# Patient Record
(demographics unavailable — no encounter records)

---

## 2025-05-11 NOTE — HISTORY OF PRESENT ILLNESS
[FreeTextEntry1] : Patient presents for comprehensive medical evaluation today.   [de-identified] : Patient presents for comprehensive medical evaluation today.  Presents to establish care  62F - PMHx: Prediabetes, HLD  FBW reviewed with patient Has been drinking alcohol more often - acknowledges she must cut down  LFTs elevated

## 2025-05-11 NOTE — HISTORY OF PRESENT ILLNESS
[FreeTextEntry1] : Patient presents for comprehensive medical evaluation today.   [de-identified] : Patient presents for comprehensive medical evaluation today.  Presents to establish care  62F - PMHx: Prediabetes, HLD  FBW reviewed with patient Has been drinking alcohol more often - acknowledges she must cut down  LFTs elevated

## 2025-05-11 NOTE — HEALTH RISK ASSESSMENT
[Good] : ~his/her~  mood as  good [1 or 2 (0 pts)] : 1 or 2 (0 points) [Never (0 pts)] : Never (0 points) [No falls in past year] : Patient reported no falls in the past year [0] : 2) Feeling down, depressed, or hopeless: Not at all (0) [PHQ-2 Negative - No further assessment needed] : PHQ-2 Negative - No further assessment needed [Never] : Never [NO] : No [Patient reported mammogram was normal] : Patient reported mammogram was normal [Patient reported PAP Smear was normal] : Patient reported PAP Smear was normal [Patient reported colonoscopy was normal] : Patient reported colonoscopy was normal [None] : None [With Family] : lives with family [Employed] : employed [] :  [Feels Safe at Home] : Feels safe at home [Fully functional (bathing, dressing, toileting, transferring, walking, feeding)] : Fully functional (bathing, dressing, toileting, transferring, walking, feeding) [Fully functional (using the telephone, shopping, preparing meals, housekeeping, doing laundry, using] : Fully functional and needs no help or supervision to perform IADLs (using the telephone, shopping, preparing meals, housekeeping, doing laundry, using transportation, managing medications and managing finances) [Reports normal functional visual acuity (ie: able to read med bottle)] : Reports normal functional visual acuity [Smoke Detector] : smoke detector [Carbon Monoxide Detector] : carbon monoxide detector [Safety elements used in home] : safety elements used in home [Seat Belt] :  uses seat belt [Sunscreen] : uses sunscreen [Reviewed no changes] : Reviewed, no changes [No] : In the past 12 months have you used drugs other than those required for medical reasons? No [Time Spent: ___ Minutes] : I spent [unfilled] minutes performing a depression screening for this patient. [With Patient/Caregiver] : , with patient/caregiver [Audit-CScore] : 0 [de-identified] : active [de-identified] : balanced varied diet without restrictions  [[id number]] : 0 [Change in mental status noted] : No change in mental status noted [Language] : denies difficulty with language [Behavior] : denies difficulty with behavior [Learning/Retaining New Information] : denies difficulty learning/retaining new information [Handling Complex Tasks] : denies difficulty handling complex tasks [Reasoning] : denies difficulty with reasoning [Spatial Ability and Orientation] : denies difficulty with spatial ability and orientation [High Risk Behavior] : no high risk behavior [Reports changes in hearing] : Reports no changes in hearing [Reports changes in vision] : Reports no changes in vision [Reports changes in dental health] : Reports no changes in dental health [Travel to Developing Areas] : does not  travel to developing areas [TB Exposure] : is not being exposed to tuberculosis [Caregiver Concerns] : does not have caregiver concerns [MammogramDate] : 9/2024 [PapSmearDate] : 4/2024 [ColonoscopyDate] : 4/2025 [ColonoscopyComments] : Dr. Ron Puga - polyp - 5 years  [AdvancecareDate] : 5/2025

## 2025-05-11 NOTE — HEALTH RISK ASSESSMENT
[Good] : ~his/her~  mood as  good [1 or 2 (0 pts)] : 1 or 2 (0 points) [Never (0 pts)] : Never (0 points) [No falls in past year] : Patient reported no falls in the past year [0] : 2) Feeling down, depressed, or hopeless: Not at all (0) [PHQ-2 Negative - No further assessment needed] : PHQ-2 Negative - No further assessment needed [Never] : Never [NO] : No [Patient reported mammogram was normal] : Patient reported mammogram was normal [Patient reported PAP Smear was normal] : Patient reported PAP Smear was normal [Patient reported colonoscopy was normal] : Patient reported colonoscopy was normal [None] : None [With Family] : lives with family [Employed] : employed [] :  [Feels Safe at Home] : Feels safe at home [Fully functional (bathing, dressing, toileting, transferring, walking, feeding)] : Fully functional (bathing, dressing, toileting, transferring, walking, feeding) [Fully functional (using the telephone, shopping, preparing meals, housekeeping, doing laundry, using] : Fully functional and needs no help or supervision to perform IADLs (using the telephone, shopping, preparing meals, housekeeping, doing laundry, using transportation, managing medications and managing finances) [Reports normal functional visual acuity (ie: able to read med bottle)] : Reports normal functional visual acuity [Smoke Detector] : smoke detector [Carbon Monoxide Detector] : carbon monoxide detector [Safety elements used in home] : safety elements used in home [Seat Belt] :  uses seat belt [Sunscreen] : uses sunscreen [Reviewed no changes] : Reviewed, no changes [No] : In the past 12 months have you used drugs other than those required for medical reasons? No [Time Spent: ___ Minutes] : I spent [unfilled] minutes performing a depression screening for this patient. [With Patient/Caregiver] : , with patient/caregiver [Audit-CScore] : 0 [de-identified] : active [de-identified] : balanced varied diet without restrictions  [[id number]] : 0 [Change in mental status noted] : No change in mental status noted [Language] : denies difficulty with language [Behavior] : denies difficulty with behavior [Learning/Retaining New Information] : denies difficulty learning/retaining new information [Handling Complex Tasks] : denies difficulty handling complex tasks [Reasoning] : denies difficulty with reasoning [Spatial Ability and Orientation] : denies difficulty with spatial ability and orientation [High Risk Behavior] : no high risk behavior [Reports changes in hearing] : Reports no changes in hearing [Reports changes in vision] : Reports no changes in vision [Reports changes in dental health] : Reports no changes in dental health [Travel to Developing Areas] : does not  travel to developing areas [TB Exposure] : is not being exposed to tuberculosis [Caregiver Concerns] : does not have caregiver concerns [MammogramDate] : 9/2024 [PapSmearDate] : 4/2024 [ColonoscopyDate] : 4/2025 [ColonoscopyComments] : Dr. Ron Puga - polyp - 5 years  [AdvancecareDate] : 5/2025

## 2025-05-11 NOTE — ASSESSMENT
[Vaccines Reviewed] : Immunizations reviewed today. Please see immunization details in the vaccine log within the immunization flowsheet.  [FreeTextEntry1] : EKG obtained to assist in diagnosis and management of assessed problem(s).    EKG shows sinus rhythm without acute ischemic changes.  #Transaminitis ?fatty liver  advise alcohol cessation  [ ] RUQ US   #Prediabetes diet and lifestyle modification discussed at length  #HTN Patient with history of hypertension.  BP today elevated - will check at home Patient to continue current regimen as prescribed. Patient instructed to continue monitoring blood pressure at home and to keep a journal. Will continue to monitor patient's bp and adjust his hypertensive regimen as needed.   I spent a total of 40 minutes on the date of this encounter that EXCLUDES time spent on AWV, preventive exam, depression screening, tobacco cessation, lung cancer screening and behavioral counseling.  This additional time included: Preparing to see the patient (e.g., review of test results) Obtaining and/or reviewing separately obtained history Performing a medically appropriate exam and/or evaluation Counseling and educating the patient/family/caregiver Ordering medications, tests, procedures Referring and communicating with other healthcare professionals, when not separately reported Documenting clinical information in the health record Care coordination not separately reported

## 2025-05-29 NOTE — HISTORY OF PRESENT ILLNESS
[facility]  Progress Note - Angeli Hernández 1948, 67 y o  female MRN: [id number]  Unit/Bed#: W -01 Encounter: [id number]  Primary Care Provider: Vanessa Ellison DO   Date and time admitted to hospital: 1/19/2021 12:21 PM    * Pneumoperitoneum  Assessment & Plan  · KUB performed this morning was noted to show free air under the diaphragm  This is new from CT scan on admission  · Concerned that this could be related to EGD/colonoscopy performed yesterday  · General surgery and GI discussed  · General surgery taking the patient to the OR for exploratory laparotomy this afternoon  · Patient will be made NPO  · Continue IV fluid hydration and pain control  · Family was updated by both GI and General surgery  C  difficile diarrhea  Assessment & Plan  · Noted to have PCR positive but EIA  · Given no prior history and evidence of diarrhea, started on vancomycin  · Plan for treatment x 14 days  · Continue without patient vancomycin for a total of 14 days  · Continue through to 2/3/21     Acute GI bleeding  Assessment & Plan   CT scan on admission with no acute findings to account for GI bleeding  No colitis or abscess noted  She has stable postsurgical findings   S/P EGD and colonoscopy 1/20 - likely self limited diverticular bleed  Old blood noted and poor prep   EGD with moderate gastritis and candida esophagitis  o Continue PPI   GI and general surgery following - now with pneumoperitoneum and proceeding with exploratory laparotomy   Hemoglobin stable and has not required any transfusions    Lab Results   Component Value Date    HGB 10 2 (L) 01/20/2021    HGB 10 7 (L) 01/20/2021    HGB 12 4 01/19/2021    HGB 14 3 01/18/2021       H/O hernia repair  Assessment & Plan  · History of hernia repair with Dr Snow Dang on 1/4/2021  · She was seen in her office day prior to admission and subsequently sent into the emergency department with more episodes of diarrhea    · Feel that her acute GI bleeding is not related to surgery, and merely coincidental   · CT revealing stable postsurgical findings  · Outpatient follow-up as directed with surgery team    Hyperlipidemia  Assessment & Plan  · Continue statin  VTE Pharmacologic Prophylaxis: VTE Score: 3 Moderate Risk (Score 3-4) - Pharmacological DVT Prophylaxis Contraindicated  Sequential Compression Devices Ordered  Patient Centered Rounds: I have performed bedside rounds with nursing staff today  Helen Landau with Specialists or Other Care Team Provider: GI, surgery    Education and Discussions with Family / Patient: Updated  () via phone  Time Spent for Care: 30 minutes  More than 50% of total time spent on counseling and coordination of care as described above  Current Length of Stay: 2 day(s)  Current Patient Status: Inpatient   Certification Statement: The patient will continue to require additional inpatient hospital stay due to exploratory lapartomy  Discharge Plan: Anticipate discharge in 48 hrs to home  Code Status: Level 1 - Full Code    Subjective:   Pain in shoulders this AM  Tolerated clear liquids  Now aware of free air and plan for surgery    Objective:     Vitals:   Temp (24hrs), Av 1 °F (36 7 °C), Min:97 6 °F (36 4 °C), Max:98 5 °F (36 9 °C)    Temp:  [97 6 °F (36 4 °C)-98 5 °F (36 9 °C)] 98 5 °F (36 9 °C)  HR:  [] 105  Resp:  [16-20] 16  BP: ()/(50-99) 161/99  SpO2:  [90 %-98 %] 94 %  Body mass index is 26 33 kg/m²  Input and Output Summary (last 24 hours): Intake/Output Summary (Last 24 hours) at 2021 1207  Last data filed at 2021 1133  Gross per 24 hour   Intake 2346 ml   Output 400 ml   Net 1946 ml       Physical Exam:   Physical Exam  Vitals signs and nursing note reviewed  Constitutional:       General: She is not in acute distress  Appearance: Normal appearance  She is not diaphoretic  HENT:      Head: Normocephalic and atraumatic  Mouth/Throat:      Mouth: Mucous membranes are moist    Cardiovascular:      Rate and Rhythm: Normal rate and regular rhythm  Pulses: Normal pulses  Pulmonary:      Effort: Pulmonary effort is normal       Breath sounds: Normal breath sounds  No wheezing or rales  Abdominal:      Palpations: There is no mass  Tenderness: There is abdominal tenderness (periumbilical)  There is no guarding  Musculoskeletal:      Right lower leg: No edema  Left lower leg: No edema  Skin:     General: Skin is warm and dry  Neurological:      Mental Status: She is alert  Psychiatric:      Comments: tearful        Additional Data:     Labs:  Results from last 7 days   Lab Units 01/21/21  0436  01/20/21  0447   WBC Thousand/uL 10 48*  --  8 85   HEMOGLOBIN g/dL 10 6*   < > 10 7*   HEMATOCRIT % 33 2*   < > 34 1*   PLATELETS Thousands/uL 329  --  320   NEUTROS PCT %  --   --  68   LYMPHS PCT %  --   --  22   MONOS PCT %  --   --  6   EOS PCT %  --   --  2    < > = values in this interval not displayed  Results from last 7 days   Lab Units 01/21/21  0436  01/19/21  1253   SODIUM mmol/L 144   < > 140   POTASSIUM mmol/L 3 5   < > 4 0   CHLORIDE mmol/L 108   < > 104   CO2 mmol/L 27   < > 27   BUN mg/dL 7   < > 19   CREATININE mg/dL 0 89   < > 0 90   ANION GAP mmol/L 9   < > 9   CALCIUM mg/dL 8 4   < > 8 9   ALBUMIN g/dL  --   --  3 5   TOTAL BILIRUBIN mg/dL  --   --  0 33   ALK PHOS U/L  --   --  83   ALT U/L  --   --  65   AST U/L  --   --  31   GLUCOSE RANDOM mg/dL 123   < > 76    < > = values in this interval not displayed       Results from last 7 days   Lab Units 01/19/21  1253   INR  0 96             Results from last 7 days   Lab Units 01/19/21  1253   LACTIC ACID mmol/L 1 7       Lines/Drains:  Invasive Devices     Peripheral Intravenous Line            Peripheral IV 01/19/21 Left Antecubital 1 day                Telemetry:        Imaging: Reviewed radiology reports from this admission including: abdominal/pelvic CT scan and xray(s)  Personally reviewed the following imaging: xray(s)    Recent Cultures (last 7 days):   Results from last 7 days   Lab Units 01/20/21  0001   C DIFF TOXIN B BY PCR  Positive*       Last 24 Hours Medication List:   Current Facility-Administered Medications   Medication Dose Route Frequency Provider Last Rate    acetaminophen  650 mg Oral Q6H PRN Michael Flores PA-C      HYDROmorphone  1 mg Intravenous Q1H PRN Donald Hart MD      metroNIDAZOLE  500 mg Intravenous Q8H Donald Hart MD      ondansetron  4 mg Intravenous Q6H PRN Michael Flores PA-C      pantoprazole  40 mg Oral BID AC Juanito Gomez DO      sodium chloride  125 mL/hr Intravenous Continuous Emmy Hooks PA-C 125 mL/hr (01/21/21 1133)    vancomycin  125 mg Oral Q6H [address] Albina Gutierrez PA-C          Today, Patient Was Seen By: Miguel Lopez PA-C    ** Please Note: Dictation voice to text software may have been used in the creation of this document   ** [FreeTextEntry1] : Patient presents today for follow-up of chronic medical conditions.  [de-identified] : 62F - PMHx: Prediabetes, HLD  BP at home and in recent office visits elevated - never previously on medication FBW reviewed with patient Has been drinking alcohol more often - acknowledges she must cut down  LFTs elevated

## 2025-05-29 NOTE — ASSESSMENT
[Vaccines Reviewed] : Immunizations reviewed today. Please see immunization details in the vaccine log within the immunization flowsheet.  [FreeTextEntry1] : EKG obtained to assist in diagnosis and management of assessed problem(s).    EKG shows sinus rhythm without acute ischemic changes.  #Transaminitis ?fatty liver  advise alcohol cessation  [ ] RUQ US   #Prediabetes diet and lifestyle modification discussed at length  #HTN Patient with history of hypertension.  [ ] start amlodipine 2.5 - will continue to monitor and will increase to 5 mg if remains elevated Patient to continue current regimen as prescribed. Patient instructed to continue monitoring blood pressure at home and to keep a journal. Will continue to monitor patient's bp and adjust his hypertensive regimen as needed.   I spent a total of 40 minutes on the date of this encounter that EXCLUDES time spent on AWV, preventive exam, depression screening, tobacco cessation, lung cancer screening and behavioral counseling.  This additional time included: Preparing to see the patient (e.g., review of test results) Obtaining and/or reviewing separately obtained history Performing a medically appropriate exam and/or evaluation Counseling and educating the patient/family/caregiver Ordering medications, tests, procedures Referring and communicating with other healthcare professionals, when not separately reported Documenting clinical information in the health record Care coordination not separately reported

## 2025-05-29 NOTE — HEALTH RISK ASSESSMENT
[Good] : ~his/her~  mood as  good [1 or 2 (0 pts)] : 1 or 2 (0 points) [Never (0 pts)] : Never (0 points) [No] : In the past 12 months have you used drugs other than those required for medical reasons? No [No falls in past year] : Patient reported no falls in the past year [0] : 2) Feeling down, depressed, or hopeless: Not at all (0) [PHQ-2 Negative - No further assessment needed] : PHQ-2 Negative - No further assessment needed [Time Spent: ___ Minutes] : I spent [unfilled] minutes performing a depression screening for this patient. [With Patient/Caregiver] : , with patient/caregiver [Reviewed no changes] : Reviewed, no changes [Never] : Never [NO] : No [Patient reported mammogram was normal] : Patient reported mammogram was normal [Patient reported PAP Smear was normal] : Patient reported PAP Smear was normal [Patient reported colonoscopy was normal] : Patient reported colonoscopy was normal [None] : None [With Family] : lives with family [Employed] : employed [] :  [Feels Safe at Home] : Feels safe at home [Fully functional (bathing, dressing, toileting, transferring, walking, feeding)] : Fully functional (bathing, dressing, toileting, transferring, walking, feeding) [Fully functional (using the telephone, shopping, preparing meals, housekeeping, doing laundry, using] : Fully functional and needs no help or supervision to perform IADLs (using the telephone, shopping, preparing meals, housekeeping, doing laundry, using transportation, managing medications and managing finances) [Reports normal functional visual acuity (ie: able to read med bottle)] : Reports normal functional visual acuity [Smoke Detector] : smoke detector [Carbon Monoxide Detector] : carbon monoxide detector [Safety elements used in home] : safety elements used in home [Seat Belt] :  uses seat belt [Sunscreen] : uses sunscreen [Audit-CScore] : 0 [de-identified] : active [de-identified] : balanced varied diet without restrictions  [[id number]] : 0 [AdvancecareDate] : 5/2025 [Change in mental status noted] : No change in mental status noted [Language] : denies difficulty with language [Behavior] : denies difficulty with behavior [Learning/Retaining New Information] : denies difficulty learning/retaining new information [Handling Complex Tasks] : denies difficulty handling complex tasks [Reasoning] : denies difficulty with reasoning [Spatial Ability and Orientation] : denies difficulty with spatial ability and orientation [High Risk Behavior] : no high risk behavior [Reports changes in hearing] : Reports no changes in hearing [Reports changes in vision] : Reports no changes in vision [Reports changes in dental health] : Reports no changes in dental health [Travel to Developing Areas] : does not  travel to developing areas [TB Exposure] : is not being exposed to tuberculosis [Caregiver Concerns] : does not have caregiver concerns [MammogramDate] : 9/2024 [PapSmearDate] : 4/2024 [ColonoscopyDate] : 4/2025 [ColonoscopyComments] : Dr. Ron Puga - polyp - 5 years

## 2025-07-30 NOTE — HEALTH RISK ASSESSMENT
[Good] : ~his/her~  mood as  good [1 or 2 (0 pts)] : 1 or 2 (0 points) [Never (0 pts)] : Never (0 points) [No] : In the past 12 months have you used drugs other than those required for medical reasons? No [No falls in past year] : Patient reported no falls in the past year [0] : 2) Feeling down, depressed, or hopeless: Not at all (0) [PHQ-2 Negative - No further assessment needed] : PHQ-2 Negative - No further assessment needed [Time Spent: ___ Minutes] : I spent [unfilled] minutes performing a depression screening for this patient. [With Patient/Caregiver] : , with patient/caregiver [Reviewed no changes] : Reviewed, no changes [Never] : Never [NO] : No [Patient reported mammogram was normal] : Patient reported mammogram was normal [Patient reported PAP Smear was normal] : Patient reported PAP Smear was normal [Patient reported colonoscopy was normal] : Patient reported colonoscopy was normal [None] : None [With Family] : lives with family [Employed] : employed [] :  [Feels Safe at Home] : Feels safe at home [Fully functional (bathing, dressing, toileting, transferring, walking, feeding)] : Fully functional (bathing, dressing, toileting, transferring, walking, feeding) [Fully functional (using the telephone, shopping, preparing meals, housekeeping, doing laundry, using] : Fully functional and needs no help or supervision to perform IADLs (using the telephone, shopping, preparing meals, housekeeping, doing laundry, using transportation, managing medications and managing finances) [Reports normal functional visual acuity (ie: able to read med bottle)] : Reports normal functional visual acuity [Smoke Detector] : smoke detector [Carbon Monoxide Detector] : carbon monoxide detector [Safety elements used in home] : safety elements used in home [Seat Belt] :  uses seat belt [Sunscreen] : uses sunscreen [Audit-CScore] : 0 [de-identified] : active [de-identified] : balanced varied diet without restrictions  [[id number]] : 0 [AdvancecareDate] : 5/2025 [Change in mental status noted] : No change in mental status noted [Language] : denies difficulty with language [Behavior] : denies difficulty with behavior [Learning/Retaining New Information] : denies difficulty learning/retaining new information [Handling Complex Tasks] : denies difficulty handling complex tasks [Reasoning] : denies difficulty with reasoning [Spatial Ability and Orientation] : denies difficulty with spatial ability and orientation [High Risk Behavior] : no high risk behavior [Reports changes in hearing] : Reports no changes in hearing [Reports changes in vision] : Reports no changes in vision [Reports changes in dental health] : Reports no changes in dental health [Travel to Developing Areas] : does not  travel to developing areas [TB Exposure] : is not being exposed to tuberculosis [Caregiver Concerns] : does not have caregiver concerns [MammogramDate] : 9/2024 [PapSmearDate] : 4/2024 [ColonoscopyDate] : 4/2025 [ColonoscopyComments] : Dr. Ron Puga - polyp - 5 years

## 2025-07-30 NOTE — HISTORY OF PRESENT ILLNESS
[FreeTextEntry1] : Patient presents today for follow-up of chronic medical conditions.  [de-identified] : PMHx: Prediabetes, HLD  BP log - 120s/90s Feeling better  BP at home and in recent office visits elevated - never previously on medication FBW reviewed with patient Has been drinking alcohol more often - acknowledges she must cut down  LFTs elevated

## 2025-07-30 NOTE — ASSESSMENT
[Vaccines Reviewed] : Immunizations reviewed today. Please see immunization details in the vaccine log within the immunization flowsheet.  [FreeTextEntry1] : EKG obtained to assist in diagnosis and management of assessed problem(s).    EKG shows sinus rhythm without acute ischemic changes.  #Transaminitis RUQ US - prominent fatty liver advise alcohol cessation  weight loss, statin  #Prediabetes diet and lifestyle modification discussed at length  #HTN Patient with history of hypertension.  [ ] increase amlodipine 5 - will continue to monitor and will increase to 5 mg if remains elevated Patient to continue current regimen as prescribed. Patient instructed to continue monitoring blood pressure at home and to keep a journal. Will continue to monitor patient's bp and adjust his hypertensive regimen as needed.   I spent a total of 40 minutes on the date of this encounter that EXCLUDES time spent on AWV, preventive exam, depression screening, tobacco cessation, lung cancer screening and behavioral counseling.  This additional time included: Preparing to see the patient (e.g., review of test results) Obtaining and/or reviewing separately obtained history Performing a medically appropriate exam and/or evaluation Counseling and educating the patient/family/caregiver Ordering medications, tests, procedures Referring and communicating with other healthcare professionals, when not separately reported Documenting clinical information in the health record Care coordination not separately reported

## 2025-07-30 NOTE — HEALTH RISK ASSESSMENT
[Good] : ~his/her~  mood as  good [1 or 2 (0 pts)] : 1 or 2 (0 points) [Never (0 pts)] : Never (0 points) [No] : In the past 12 months have you used drugs other than those required for medical reasons? No [No falls in past year] : Patient reported no falls in the past year [0] : 2) Feeling down, depressed, or hopeless: Not at all (0) [PHQ-2 Negative - No further assessment needed] : PHQ-2 Negative - No further assessment needed [Time Spent: ___ Minutes] : I spent [unfilled] minutes performing a depression screening for this patient. [With Patient/Caregiver] : , with patient/caregiver [Reviewed no changes] : Reviewed, no changes [Never] : Never [NO] : No [Patient reported mammogram was normal] : Patient reported mammogram was normal [Patient reported PAP Smear was normal] : Patient reported PAP Smear was normal [Patient reported colonoscopy was normal] : Patient reported colonoscopy was normal [None] : None [With Family] : lives with family [Employed] : employed [] :  [Feels Safe at Home] : Feels safe at home [Fully functional (bathing, dressing, toileting, transferring, walking, feeding)] : Fully functional (bathing, dressing, toileting, transferring, walking, feeding) [Fully functional (using the telephone, shopping, preparing meals, housekeeping, doing laundry, using] : Fully functional and needs no help or supervision to perform IADLs (using the telephone, shopping, preparing meals, housekeeping, doing laundry, using transportation, managing medications and managing finances) [Reports normal functional visual acuity (ie: able to read med bottle)] : Reports normal functional visual acuity [Smoke Detector] : smoke detector [Carbon Monoxide Detector] : carbon monoxide detector [Safety elements used in home] : safety elements used in home [Seat Belt] :  uses seat belt [Sunscreen] : uses sunscreen [Audit-CScore] : 0 [de-identified] : active [de-identified] : balanced varied diet without restrictions  [HCE0Exiar] : 0 [AdvancecareDate] : 5/2025 [Change in mental status noted] : No change in mental status noted [Language] : denies difficulty with language [Behavior] : denies difficulty with behavior [Learning/Retaining New Information] : denies difficulty learning/retaining new information [Handling Complex Tasks] : denies difficulty handling complex tasks [Reasoning] : denies difficulty with reasoning [Spatial Ability and Orientation] : denies difficulty with spatial ability and orientation [High Risk Behavior] : no high risk behavior [Reports changes in hearing] : Reports no changes in hearing [Reports changes in vision] : Reports no changes in vision [Reports changes in dental health] : Reports no changes in dental health [Travel to Developing Areas] : does not  travel to developing areas [TB Exposure] : is not being exposed to tuberculosis [Caregiver Concerns] : does not have caregiver concerns [MammogramDate] : 9/2024 [PapSmearDate] : 4/2024 [ColonoscopyDate] : 4/2025 [ColonoscopyComments] : Dr. Ron Puga - polyp - 5 years

## 2025-07-30 NOTE — HISTORY OF PRESENT ILLNESS
[FreeTextEntry1] : Patient presents today for follow-up of chronic medical conditions.  [de-identified] : PMHx: Prediabetes, HLD  BP log - 120s/90s Feeling better  BP at home and in recent office visits elevated - never previously on medication FBW reviewed with patient Has been drinking alcohol more often - acknowledges she must cut down  LFTs elevated